# Patient Record
Sex: MALE | Race: WHITE
[De-identification: names, ages, dates, MRNs, and addresses within clinical notes are randomized per-mention and may not be internally consistent; named-entity substitution may affect disease eponyms.]

---

## 2017-01-16 ENCOUNTER — HOSPITAL ENCOUNTER (EMERGENCY)
Dept: HOSPITAL 83 - ED | Age: 82
Discharge: HOME | End: 2017-01-16
Payer: MEDICARE

## 2017-01-16 VITALS — WEIGHT: 240 LBS | HEIGHT: 60 IN

## 2017-01-16 VITALS — SYSTOLIC BLOOD PRESSURE: 174 MMHG | DIASTOLIC BLOOD PRESSURE: 76 MMHG

## 2017-01-16 DIAGNOSIS — N39.41: ICD-10-CM

## 2017-01-16 DIAGNOSIS — Z90.89: ICD-10-CM

## 2017-01-16 DIAGNOSIS — Z96.652: ICD-10-CM

## 2017-01-16 DIAGNOSIS — L03.116: Primary | ICD-10-CM

## 2017-01-16 DIAGNOSIS — Z88.5: ICD-10-CM

## 2017-01-16 DIAGNOSIS — Z79.82: ICD-10-CM

## 2017-01-16 LAB
ALBUMIN SERPL-MCNC: 3.5 GM/DL (ref 3.1–4.5)
ALP SERPL-CCNC: 67 U/L (ref 45–117)
ALT SERPL W P-5'-P-CCNC: 15 U/L (ref 12–78)
AST SERPL-CCNC: 8 IU/L (ref 3–35)
BASOPHILS # BLD AUTO: 0 10*3/UL (ref 0–0.1)
BASOPHILS NFR BLD AUTO: 0.3 % (ref 0–1)
BUN SERPL-MCNC: 25 MG/DL (ref 7–24)
CHLORIDE SERPL-SCNC: 104 MMOL/L (ref 98–107)
CO2 SERPL-SCNC: 29 MMOL/L (ref 21–32)
EOSINOPHIL # BLD AUTO: 0.2 10*3/UL (ref 0–0.4)
EOSINOPHIL # BLD AUTO: 2.1 % (ref 1–4)
ERYTHROCYTE [DISTWIDTH] IN BLOOD BY AUTOMATED COUNT: 13.7 % (ref 0–14.5)
GLUCOSE SERPL-MCNC: 140 MG/DL (ref 65–99)
HCT VFR BLD AUTO: 43.1 % (ref 42–52)
HGB BLD-MCNC: 14.4 G/DL (ref 14–18)
IG #: 0.1 10*3/UL (ref 0–0.1)
LYMPHOCYTES # BLD AUTO: 1.3 10*3/UL (ref 1.3–4.4)
LYMPHOCYTES NFR BLD AUTO: 12.4 % (ref 27–41)
MCH RBC QN AUTO: 28.4 PG (ref 27–31)
MCHC RBC AUTO-ENTMCNC: 33.4 G/DL (ref 33–37)
MCV RBC AUTO: 85 FL (ref 80–94)
MONOCYTES # BLD AUTO: 0.7 10*3/UL (ref 0.1–1)
MONOCYTES NFR BLD MANUAL: 6.2 % (ref 3–9)
NEUT #: 8.2 10*3/UL (ref 2.3–7.9)
NEUT %: 78.4 % (ref 47–73)
NRBC BLD QL AUTO: 0 10*3/UL (ref 0–0)
PLATELET # BLD AUTO: 230 10*3/UL (ref 130–400)
PMV BLD AUTO: 11.1 FL (ref 9.6–12.3)
POTASSIUM SERPL-SCNC: 4.9 MMOL/L (ref 3.5–5.1)
PROT SERPL-MCNC: 7.3 GM/DL (ref 6.4–8.2)
RBC # BLD AUTO: 5.07 10*6/UL (ref 4.5–5.9)
SODIUM SERPL-SCNC: 139 MMOL/L (ref 136–145)
URATE SERPL-MCNC: 6.6 MG/DL (ref 3.5–7.2)
WBC NRBC COR # BLD AUTO: 10.4 10*3/UL (ref 4.8–10.8)

## 2020-02-17 ENCOUNTER — HOSPITAL ENCOUNTER (EMERGENCY)
Dept: HOSPITAL 83 - ED | Age: 85
Discharge: ANOTHER HOSPITAL | End: 2020-02-17
Payer: MEDICARE

## 2020-02-17 VITALS — DIASTOLIC BLOOD PRESSURE: 81 MMHG

## 2020-02-17 VITALS — WEIGHT: 236 LBS | BODY MASS INDEX: 33.79 KG/M2 | HEIGHT: 70 IN

## 2020-02-17 DIAGNOSIS — I48.20: Primary | ICD-10-CM

## 2020-02-17 DIAGNOSIS — Z79.2: ICD-10-CM

## 2020-02-17 DIAGNOSIS — I50.9: ICD-10-CM

## 2020-02-17 DIAGNOSIS — E11.9: ICD-10-CM

## 2020-02-17 DIAGNOSIS — Z88.5: ICD-10-CM

## 2020-02-17 DIAGNOSIS — E78.5: ICD-10-CM

## 2020-02-17 DIAGNOSIS — I11.0: ICD-10-CM

## 2020-02-17 DIAGNOSIS — Z79.899: ICD-10-CM

## 2020-02-17 DIAGNOSIS — R79.89: ICD-10-CM

## 2020-02-17 DIAGNOSIS — Z79.82: ICD-10-CM

## 2020-02-17 DIAGNOSIS — M19.90: ICD-10-CM

## 2020-02-17 LAB
ALBUMIN SERPL-MCNC: 3.6 GM/DL (ref 3.1–4.5)
ALP SERPL-CCNC: 55 U/L (ref 45–117)
ALT SERPL W P-5'-P-CCNC: 20 U/L (ref 12–78)
APTT PPP: 27.6 SECONDS (ref 20–32.1)
AST SERPL-CCNC: 12 IU/L (ref 3–35)
BASOPHILS # BLD AUTO: 0 10*3/UL (ref 0–0.1)
BASOPHILS NFR BLD AUTO: 0.2 % (ref 0–1)
BUN SERPL-MCNC: 30 MG/DL (ref 7–24)
CHLORIDE SERPL-SCNC: 105 MMOL/L (ref 98–107)
CREAT SERPL-MCNC: 1.55 MG/DL (ref 0.7–1.3)
EOSINOPHIL # BLD AUTO: 0.2 10*3/UL (ref 0–0.4)
EOSINOPHIL # BLD AUTO: 1.7 % (ref 1–4)
ERYTHROCYTE [DISTWIDTH] IN BLOOD BY AUTOMATED COUNT: 13.3 % (ref 0–14.5)
HCT VFR BLD AUTO: 47.8 % (ref 42–52)
HGB BLD-MCNC: 15.5 G/DL (ref 14–18)
INR BLD: 0.9 (ref 2–3.5)
LYMPHOCYTES # BLD AUTO: 1.4 10*3/UL (ref 1.3–4.4)
LYMPHOCYTES NFR BLD AUTO: 11.7 % (ref 27–41)
MCH RBC QN AUTO: 28.4 PG (ref 27–31)
MCHC RBC AUTO-ENTMCNC: 32.4 G/DL (ref 33–37)
MCV RBC AUTO: 87.7 FL (ref 80–94)
MONOCYTES # BLD AUTO: 0.8 10*3/UL (ref 0.1–1)
MONOCYTES NFR BLD MANUAL: 6.4 % (ref 3–9)
NEUT #: 9.6 10*3/UL (ref 2.3–7.9)
NEUT %: 79.3 % (ref 47–73)
NRBC BLD QL AUTO: 0 10*3/UL (ref 0–0)
PLATELET # BLD AUTO: 229 10*3/UL (ref 130–400)
PMV BLD AUTO: 11.7 FL (ref 9.6–12.3)
POTASSIUM SERPL-SCNC: 4.7 MMOL/L (ref 3.5–5.1)
PROT SERPL-MCNC: 7.1 GM/DL (ref 6.4–8.2)
RBC # BLD AUTO: 5.45 10*6/UL (ref 4.5–5.9)
SODIUM SERPL-SCNC: 138 MMOL/L (ref 136–145)
TROPONIN I SERPL-MCNC: 0.09 NG/ML (ref ?–0.04)
WBC NRBC COR # BLD AUTO: 12.1 10*3/UL (ref 4.8–10.8)

## 2020-03-18 ENCOUNTER — HOSPITAL ENCOUNTER (INPATIENT)
Dept: HOSPITAL 83 - ED | Age: 85
LOS: 1 days | Discharge: ANOTHER HOSPITAL | DRG: 280 | End: 2020-03-19
Attending: INTERNAL MEDICINE | Admitting: INTERNAL MEDICINE
Payer: MEDICARE

## 2020-03-18 VITALS — DIASTOLIC BLOOD PRESSURE: 60 MMHG | SYSTOLIC BLOOD PRESSURE: 90 MMHG

## 2020-03-18 VITALS — SYSTOLIC BLOOD PRESSURE: 153 MMHG | DIASTOLIC BLOOD PRESSURE: 75 MMHG

## 2020-03-18 VITALS — WEIGHT: 232.25 LBS | HEIGHT: 70 IN | BODY MASS INDEX: 33.25 KG/M2

## 2020-03-18 VITALS — SYSTOLIC BLOOD PRESSURE: 119 MMHG | DIASTOLIC BLOOD PRESSURE: 58 MMHG

## 2020-03-18 VITALS — DIASTOLIC BLOOD PRESSURE: 72 MMHG | SYSTOLIC BLOOD PRESSURE: 149 MMHG

## 2020-03-18 VITALS — SYSTOLIC BLOOD PRESSURE: 109 MMHG | DIASTOLIC BLOOD PRESSURE: 56 MMHG

## 2020-03-18 VITALS — DIASTOLIC BLOOD PRESSURE: 66 MMHG | SYSTOLIC BLOOD PRESSURE: 105 MMHG

## 2020-03-18 VITALS — DIASTOLIC BLOOD PRESSURE: 61 MMHG | SYSTOLIC BLOOD PRESSURE: 118 MMHG

## 2020-03-18 VITALS — DIASTOLIC BLOOD PRESSURE: 72 MMHG

## 2020-03-18 VITALS — SYSTOLIC BLOOD PRESSURE: 132 MMHG | DIASTOLIC BLOOD PRESSURE: 74 MMHG

## 2020-03-18 VITALS — DIASTOLIC BLOOD PRESSURE: 84 MMHG | SYSTOLIC BLOOD PRESSURE: 126 MMHG

## 2020-03-18 VITALS — SYSTOLIC BLOOD PRESSURE: 127 MMHG | DIASTOLIC BLOOD PRESSURE: 52 MMHG

## 2020-03-18 VITALS — DIASTOLIC BLOOD PRESSURE: 74 MMHG

## 2020-03-18 VITALS — DIASTOLIC BLOOD PRESSURE: 62 MMHG

## 2020-03-18 DIAGNOSIS — E11.65: ICD-10-CM

## 2020-03-18 DIAGNOSIS — Z98.42: ICD-10-CM

## 2020-03-18 DIAGNOSIS — E78.5: ICD-10-CM

## 2020-03-18 DIAGNOSIS — I11.0: ICD-10-CM

## 2020-03-18 DIAGNOSIS — I50.9: ICD-10-CM

## 2020-03-18 DIAGNOSIS — E87.1: ICD-10-CM

## 2020-03-18 DIAGNOSIS — I47.1: ICD-10-CM

## 2020-03-18 DIAGNOSIS — Z88.5: ICD-10-CM

## 2020-03-18 DIAGNOSIS — E11.42: ICD-10-CM

## 2020-03-18 DIAGNOSIS — Z98.41: ICD-10-CM

## 2020-03-18 DIAGNOSIS — I21.4: Primary | ICD-10-CM

## 2020-03-18 DIAGNOSIS — Z90.5: ICD-10-CM

## 2020-03-18 DIAGNOSIS — Z88.8: ICD-10-CM

## 2020-03-18 DIAGNOSIS — Z96.1: ICD-10-CM

## 2020-03-18 DIAGNOSIS — N40.0: ICD-10-CM

## 2020-03-18 DIAGNOSIS — N17.0: ICD-10-CM

## 2020-03-18 DIAGNOSIS — Z96.653: ICD-10-CM

## 2020-03-18 DIAGNOSIS — Z90.49: ICD-10-CM

## 2020-03-18 DIAGNOSIS — Z79.82: ICD-10-CM

## 2020-03-18 DIAGNOSIS — E83.41: ICD-10-CM

## 2020-03-18 DIAGNOSIS — I48.0: ICD-10-CM

## 2020-03-18 DIAGNOSIS — E44.0: ICD-10-CM

## 2020-03-18 DIAGNOSIS — Z79.899: ICD-10-CM

## 2020-03-18 DIAGNOSIS — J96.90: ICD-10-CM

## 2020-03-18 DIAGNOSIS — K21.9: ICD-10-CM

## 2020-03-18 DIAGNOSIS — Z82.49: ICD-10-CM

## 2020-03-18 DIAGNOSIS — E66.9: ICD-10-CM

## 2020-03-18 DIAGNOSIS — F32.9: ICD-10-CM

## 2020-03-18 LAB
ALBUMIN SERPL-MCNC: 3.3 GM/DL (ref 3.1–4.5)
ALP SERPL-CCNC: 60 U/L (ref 45–117)
ALT SERPL W P-5'-P-CCNC: 27 U/L (ref 12–78)
APTT PPP: 27.9 SECONDS (ref 20–32.1)
AST SERPL-CCNC: 9 IU/L (ref 3–35)
BASOPHILS # BLD AUTO: 0 10*3/UL (ref 0–0.1)
BASOPHILS NFR BLD AUTO: 0.2 % (ref 0–1)
BUN SERPL-MCNC: 27 MG/DL (ref 7–24)
CHLORIDE SERPL-SCNC: 102 MMOL/L (ref 98–107)
CREAT SERPL-MCNC: 1.85 MG/DL (ref 0.7–1.3)
EOSINOPHIL # BLD AUTO: 0.2 10*3/UL (ref 0–0.4)
EOSINOPHIL # BLD AUTO: 2.1 % (ref 1–4)
ERYTHROCYTE [DISTWIDTH] IN BLOOD BY AUTOMATED COUNT: 13.4 % (ref 0–14.5)
HCT VFR BLD AUTO: 47.4 % (ref 42–52)
HGB BLD-MCNC: 15.3 G/DL (ref 14–18)
INR BLD: 1 (ref 2–3.5)
LYMPHOCYTES # BLD AUTO: 1.2 10*3/UL (ref 1.3–4.4)
LYMPHOCYTES NFR BLD AUTO: 11.8 % (ref 27–41)
MCH RBC QN AUTO: 28.7 PG (ref 27–31)
MCHC RBC AUTO-ENTMCNC: 32.3 G/DL (ref 33–37)
MCV RBC AUTO: 88.9 FL (ref 80–94)
MONOCYTES # BLD AUTO: 0.6 10*3/UL (ref 0.1–1)
MONOCYTES NFR BLD MANUAL: 6.3 % (ref 3–9)
NEUT #: 7.8 10*3/UL (ref 2.3–7.9)
NEUT %: 78.9 % (ref 47–73)
NRBC BLD QL AUTO: 0 % (ref 0–0)
PLATELET # BLD AUTO: 249 10*3/UL (ref 130–400)
PMV BLD AUTO: 11.2 FL (ref 9.6–12.3)
POTASSIUM SERPL-SCNC: 4.4 MMOL/L (ref 3.5–5.1)
PROT SERPL-MCNC: 7 GM/DL (ref 6.4–8.2)
RBC # BLD AUTO: 5.33 10*6/UL (ref 4.5–5.9)
SODIUM SERPL-SCNC: 134 MMOL/L (ref 136–145)
TROPONIN I SERPL-MCNC: 0.1 NG/ML (ref ?–0.04)
WBC NRBC COR # BLD AUTO: 9.9 10*3/UL (ref 4.8–10.8)

## 2020-03-19 LAB
25(OH)D3 SERPL-MCNC: 14.5 NG/ML (ref 30–100)
BASOPHILS # BLD AUTO: 0 10*3/UL (ref 0–0.1)
BASOPHILS NFR BLD AUTO: 0.4 % (ref 0–1)
BUN SERPL-MCNC: 29 MG/DL (ref 7–24)
CHLORIDE SERPL-SCNC: 105 MMOL/L (ref 98–107)
CHOLEST SERPL-MCNC: 98 MG/DL (ref ?–200)
CREAT SERPL-MCNC: 1.59 MG/DL (ref 0.7–1.3)
EOSINOPHIL # BLD AUTO: 0.2 10*3/UL (ref 0–0.4)
EOSINOPHIL # BLD AUTO: 2.3 % (ref 1–4)
ERYTHROCYTE [DISTWIDTH] IN BLOOD BY AUTOMATED COUNT: 13.5 % (ref 0–14.5)
HCT VFR BLD AUTO: 44.8 % (ref 42–52)
HDLC SERPL-MCNC: 30 MG/DL (ref 40–60)
HGB BLD-MCNC: 14.3 G/DL (ref 14–18)
LDLC SERPL DIRECT ASSAY-MCNC: 48 MG/DL (ref 9–159)
LYMPHOCYTES # BLD AUTO: 1.5 10*3/UL (ref 1.3–4.4)
LYMPHOCYTES NFR BLD AUTO: 17.7 % (ref 27–41)
MCH RBC QN AUTO: 28.7 PG (ref 27–31)
MCHC RBC AUTO-ENTMCNC: 31.9 G/DL (ref 33–37)
MCV RBC AUTO: 89.8 FL (ref 80–94)
MONOCYTES # BLD AUTO: 0.7 10*3/UL (ref 0.1–1)
MONOCYTES NFR BLD MANUAL: 8.3 % (ref 3–9)
NEUT #: 5.9 10*3/UL (ref 2.3–7.9)
NEUT %: 70.7 % (ref 47–73)
NRBC BLD QL AUTO: 0 10*3/UL (ref 0–0)
PHOSPHATE SERPL-MCNC: 3.9 MG/DL (ref 2.5–4.9)
PLATELET # BLD AUTO: 227 10*3/UL (ref 130–400)
PMV BLD AUTO: 11 FL (ref 9.6–12.3)
POTASSIUM SERPL-SCNC: 4.8 MMOL/L (ref 3.5–5.1)
RBC # BLD AUTO: 4.99 10*6/UL (ref 4.5–5.9)
SODIUM SERPL-SCNC: 139 MMOL/L (ref 136–145)
TRIGL SERPL-MCNC: 101 MG/DL (ref ?–150)
TSH SERPL DL<=0.005 MIU/L-ACNC: 4.48 UIU/ML (ref 0.36–4.75)
VITAMIN B12: 187 PG/ML (ref 247–911)
VLDLC SERPL CALC-MCNC: 20 MG/DL (ref 6–40)
WBC NRBC COR # BLD AUTO: 8.3 10*3/UL (ref 4.8–10.8)

## 2020-09-19 ENCOUNTER — HOSPITAL ENCOUNTER (INPATIENT)
Dept: HOSPITAL 83 - ED | Age: 85
LOS: 2 days | Discharge: HOME | DRG: 189 | End: 2020-09-21
Attending: INTERNAL MEDICINE | Admitting: INTERNAL MEDICINE
Payer: MEDICARE

## 2020-09-19 VITALS — DIASTOLIC BLOOD PRESSURE: 86 MMHG

## 2020-09-19 VITALS — HEIGHT: 68.98 IN | WEIGHT: 238.56 LBS | BODY MASS INDEX: 35.33 KG/M2

## 2020-09-19 VITALS — SYSTOLIC BLOOD PRESSURE: 154 MMHG | DIASTOLIC BLOOD PRESSURE: 69 MMHG

## 2020-09-19 VITALS — SYSTOLIC BLOOD PRESSURE: 141 MMHG | DIASTOLIC BLOOD PRESSURE: 76 MMHG

## 2020-09-19 VITALS — DIASTOLIC BLOOD PRESSURE: 76 MMHG

## 2020-09-19 VITALS — DIASTOLIC BLOOD PRESSURE: 74 MMHG

## 2020-09-19 DIAGNOSIS — D72.829: ICD-10-CM

## 2020-09-19 DIAGNOSIS — I25.2: ICD-10-CM

## 2020-09-19 DIAGNOSIS — E66.9: ICD-10-CM

## 2020-09-19 DIAGNOSIS — I45.10: ICD-10-CM

## 2020-09-19 DIAGNOSIS — D64.9: ICD-10-CM

## 2020-09-19 DIAGNOSIS — E11.42: ICD-10-CM

## 2020-09-19 DIAGNOSIS — R79.89: ICD-10-CM

## 2020-09-19 DIAGNOSIS — E11.65: ICD-10-CM

## 2020-09-19 DIAGNOSIS — J96.22: ICD-10-CM

## 2020-09-19 DIAGNOSIS — F32.9: ICD-10-CM

## 2020-09-19 DIAGNOSIS — E83.41: ICD-10-CM

## 2020-09-19 DIAGNOSIS — J96.21: Primary | ICD-10-CM

## 2020-09-19 DIAGNOSIS — I25.10: ICD-10-CM

## 2020-09-19 DIAGNOSIS — N40.0: ICD-10-CM

## 2020-09-19 DIAGNOSIS — E78.5: ICD-10-CM

## 2020-09-19 DIAGNOSIS — K21.9: ICD-10-CM

## 2020-09-19 DIAGNOSIS — I50.9: ICD-10-CM

## 2020-09-19 DIAGNOSIS — I11.0: ICD-10-CM

## 2020-09-19 DIAGNOSIS — E53.8: ICD-10-CM

## 2020-09-19 LAB
ALBUMIN SERPL-MCNC: 3.3 GM/DL (ref 3.1–4.5)
ALP SERPL-CCNC: 59 U/L (ref 45–117)
ALT SERPL W P-5'-P-CCNC: 13 U/L (ref 12–78)
APTT PPP: 28.3 SECONDS (ref 20–32.1)
AST SERPL-CCNC: 8 IU/L (ref 3–35)
BASE EXCESS BLDA CALC-SCNC: 1.4 MMOL/L (ref -2–2)
BASOPHILS # BLD AUTO: 0 10*3/UL (ref 0–0.1)
BASOPHILS NFR BLD AUTO: 0.3 % (ref 0–1)
BUN SERPL-MCNC: 27 MG/DL (ref 7–24)
CHLORIDE SERPL-SCNC: 107 MMOL/L (ref 98–107)
CREAT SERPL-MCNC: 1.45 MG/DL (ref 0.7–1.3)
EOSINOPHIL # BLD AUTO: 0 10*3/UL (ref 0–0.4)
EOSINOPHIL # BLD AUTO: 0.4 % (ref 1–4)
ERYTHROCYTE [DISTWIDTH] IN BLOOD BY AUTOMATED COUNT: 13.9 % (ref 0–14.5)
HCO3 BLDA-SCNC: 28 MMOL/L (ref 22–26)
HCT VFR BLD AUTO: 42.9 % (ref 42–52)
INR BLD: 1 (ref 2–3.5)
LYMPHOCYTES # BLD AUTO: 0.7 10*3/UL (ref 1.3–4.4)
LYMPHOCYTES NFR BLD AUTO: 5.8 % (ref 27–41)
MCH RBC QN AUTO: 27 PG (ref 27–31)
MCHC RBC AUTO-ENTMCNC: 30.8 G/DL (ref 33–37)
MCV RBC AUTO: 87.7 FL (ref 80–94)
MONOCYTES # BLD AUTO: 0.6 10*3/UL (ref 0.1–1)
MONOCYTES NFR BLD MANUAL: 5.7 % (ref 3–9)
NEUT #: 9.8 10*3/UL (ref 2.3–7.9)
NEUT %: 87.4 % (ref 47–73)
NRBC BLD QL AUTO: 0 10*3/UL (ref 0–0)
PCO2 BLDA: 55 MMHG (ref 35–45)
PH BLDA: 7.33 [PH] (ref 7.35–7.45)
PLATELET # BLD AUTO: 198 10*3/UL (ref 130–400)
PMV BLD AUTO: 11.6 FL (ref 9.6–12.3)
PO2 BLDA: 61 MMHG (ref 80–90)
POTASSIUM SERPL-SCNC: 4.7 MMOL/L (ref 3.5–5.1)
PROT SERPL-MCNC: 7 GM/DL (ref 6.4–8.2)
RBC # BLD AUTO: 4.89 10*6/UL (ref 4.5–5.9)
SAO2 % BLDA: 91 % (ref 95–97)
SODIUM SERPL-SCNC: 137 MMOL/L (ref 136–145)
TROPONIN I SERPL-MCNC: 0.05 NG/ML (ref ?–0.04)
WBC NRBC COR # BLD AUTO: 11.2 10*3/UL (ref 4.8–10.8)

## 2020-09-19 NOTE — NUR
INCONTINENT AT THIS TIME & YELLING OUT.  HARVEY CARE PROVIDED.  PT. ENCOURAGED
TO USE CALL LIGHT.  VERBALIZED UNDERSTANDING.  CALL LIGHT WITHIN REACH/BED
ALARM INTACT.

## 2020-09-19 NOTE — NUR
TOOK PO MEDICATION WITH MUCH DIFFICULTY.  ASSIST OF PA TO ROLL PATIENT OVER ON
HIS BACK TO GIVE HIM A PO MEDICATION.

## 2020-09-19 NOTE — NUR
Time: 1330
A 86  year old MALE admitted to 
under services of ROSEMARY DEL TORO DO.
Pt. arrived via bed from ER
[facility].  Chief complaint: CHF.
 
ALEXIS GARDNER

## 2020-09-20 VITALS — DIASTOLIC BLOOD PRESSURE: 62 MMHG | SYSTOLIC BLOOD PRESSURE: 151 MMHG

## 2020-09-20 VITALS — DIASTOLIC BLOOD PRESSURE: 67 MMHG | SYSTOLIC BLOOD PRESSURE: 138 MMHG

## 2020-09-20 VITALS — DIASTOLIC BLOOD PRESSURE: 78 MMHG

## 2020-09-20 VITALS — DIASTOLIC BLOOD PRESSURE: 66 MMHG

## 2020-09-20 VITALS — DIASTOLIC BLOOD PRESSURE: 76 MMHG

## 2020-09-20 LAB
25(OH)D3 SERPL-MCNC: 17.7 NG/ML (ref 30–100)
ALBUMIN SERPL-MCNC: 3.2 GM/DL (ref 3.1–4.5)
ALP SERPL-CCNC: 59 U/L (ref 45–117)
ALT SERPL W P-5'-P-CCNC: 12 U/L (ref 12–78)
APPEARANCE UR: CLEAR
APTT PPP: 28.3 SECONDS (ref 20–32.1)
AST SERPL-CCNC: 8 IU/L (ref 3–35)
BACTERIA #/AREA URNS HPF: (no result) /[HPF]
BASOPHILS # BLD AUTO: 0 10*3/UL (ref 0–0.1)
BASOPHILS NFR BLD AUTO: 0.2 % (ref 0–1)
BILIRUB UR QL STRIP: NEGATIVE
BUN SERPL-MCNC: 30 MG/DL (ref 7–24)
CASTS URNS QL MICRO: (no result)
CHLORIDE SERPL-SCNC: 106 MMOL/L (ref 98–107)
COLOR UR: YELLOW
CREAT SERPL-MCNC: 1.32 MG/DL (ref 0.7–1.3)
EOSINOPHIL # BLD AUTO: 0.1 10*3/UL (ref 0–0.4)
EOSINOPHIL # BLD AUTO: 1.2 % (ref 1–4)
EPI CELLS #/AREA URNS HPF: (no result) /[HPF]
ERYTHROCYTE [DISTWIDTH] IN BLOOD BY AUTOMATED COUNT: 14 % (ref 0–14.5)
GLUCOSE UR QL: NEGATIVE
HCT VFR BLD AUTO: 43.1 % (ref 42–52)
HGB UR QL STRIP: NEGATIVE
INR BLD: 1 (ref 2–3.5)
KETONES UR QL STRIP: NEGATIVE
LEUKOCYTE ESTERASE UR QL STRIP: NEGATIVE
LYMPHOCYTES # BLD AUTO: 0.7 10*3/UL (ref 1.3–4.4)
LYMPHOCYTES NFR BLD AUTO: 8.4 % (ref 27–41)
MCH RBC QN AUTO: 27.5 PG (ref 27–31)
MCHC RBC AUTO-ENTMCNC: 31.1 G/DL (ref 33–37)
MCV RBC AUTO: 88.5 FL (ref 80–94)
MONOCYTES # BLD AUTO: 0.8 10*3/UL (ref 0.1–1)
MONOCYTES NFR BLD MANUAL: 9.4 % (ref 3–9)
MUCOUS THREADS URNS QL MICRO: (no result)
NEUT #: 6.9 10*3/UL (ref 2.3–7.9)
NEUT %: 80.2 % (ref 47–73)
NITRITE UR QL STRIP: NEGATIVE
NRBC BLD QL AUTO: 0 10*3/UL (ref 0–0)
PH UR STRIP: 5 [PH] (ref 4.5–8)
PLATELET # BLD AUTO: 191 10*3/UL (ref 130–400)
PMV BLD AUTO: 11.7 FL (ref 9.6–12.3)
POTASSIUM SERPL-SCNC: 4.5 MMOL/L (ref 3.5–5.1)
PROT SERPL-MCNC: 6.8 GM/DL (ref 6.4–8.2)
RBC # BLD AUTO: 4.87 10*6/UL (ref 4.5–5.9)
RBC #/AREA URNS HPF: (no result) RBC/HPF (ref 0–2)
SODIUM SERPL-SCNC: 140 MMOL/L (ref 136–145)
SP GR UR: 1.01 (ref 1–1.03)
T4 FREE SERPL-MCNC: 1 NG/DL (ref 0.76–1.46)
UROBILINOGEN UR STRIP-MCNC: 0.2 E.U./DL (ref 0–1)
VITAMIN B12: 184 PG/ML (ref 247–911)
WBC #/AREA URNS HPF: (no result) WBC/HPF (ref 0–5)
WBC NRBC COR # BLD AUTO: 8.5 10*3/UL (ref 4.8–10.8)

## 2020-09-20 NOTE — NUR
BLADDER SCANNED FOR APPROXIMATELY 300 CC'S.  CALLED DR. MANNING PERTAINING TO
BLADDER SCAN RESULTS.   NO NEW ORDERS RECEIVED.

## 2020-09-20 NOTE — NUR
PATIENT SITTING IN RECLINER. DISORIENTED TO PLACE, REORIENTS EASILY. NO
SHORTNESS OF BREATH NOTED, O2 FURTHER TITRATED TO 2L. CALL LIGHT WITHIN REACH.
NO VOICED COMPLAINTS

## 2020-09-20 NOTE — NUR
PULSE OX 99% 3L. DENIES SHORTNESS OF BREATH. NO DISTRESS. CALL LIGHT WITHIN
REACH. NO VOICED COMPLAINTS

## 2020-09-20 NOTE — NUR
ASSISTED TO RECLINER AND POSITIONED FOR COMFORT. RESPIRATIONS EASY. LUNGS
DIMINISHED, CLEAR. PULSE OX 94% 4L. TRACE PEDAL EDEMA. CALL LIGHT WITHIN
REACH. NO VOICED COMPLAINTS. BED ALARM MAINTAINED FOR SAFETY

## 2020-09-20 NOTE — NUR
[place]. PLEASANT AND COOPERATIVE. LYING IN BED ON LT SIDE. TOOK PO MEDS WITHOUT
DIFFICULTY. NO C/O VOICED. CALL LIGHT IN REACH.

## 2020-09-20 NOTE — NUR
REQUESTED AND RECEIVED TYLENOL PER PRN ORDER FOR COMPLAINTS OF HEADACHE RATING
A 5. CALL LIGHT WITHIN REACH WILL MONITOR

## 2020-09-21 VITALS — DIASTOLIC BLOOD PRESSURE: 78 MMHG | SYSTOLIC BLOOD PRESSURE: 115 MMHG

## 2020-09-21 VITALS — DIASTOLIC BLOOD PRESSURE: 72 MMHG

## 2020-09-21 LAB
BASOPHILS # BLD AUTO: 0 10*3/UL (ref 0–0.1)
BASOPHILS NFR BLD AUTO: 0.3 % (ref 0–1)
BUN SERPL-MCNC: 35 MG/DL (ref 7–24)
CHLORIDE SERPL-SCNC: 106 MMOL/L (ref 98–107)
CREAT SERPL-MCNC: 1.46 MG/DL (ref 0.7–1.3)
EOSINOPHIL # BLD AUTO: 0.2 10*3/UL (ref 0–0.4)
EOSINOPHIL # BLD AUTO: 2.7 % (ref 1–4)
ERYTHROCYTE [DISTWIDTH] IN BLOOD BY AUTOMATED COUNT: 13.9 % (ref 0–14.5)
HCT VFR BLD AUTO: 44.2 % (ref 42–52)
LYMPHOCYTES # BLD AUTO: 0.8 10*3/UL (ref 1.3–4.4)
LYMPHOCYTES NFR BLD AUTO: 11.2 % (ref 27–41)
MCH RBC QN AUTO: 27.7 PG (ref 27–31)
MCHC RBC AUTO-ENTMCNC: 31.2 G/DL (ref 33–37)
MCV RBC AUTO: 88.8 FL (ref 80–94)
MONOCYTES # BLD AUTO: 0.6 10*3/UL (ref 0.1–1)
MONOCYTES NFR BLD MANUAL: 8.5 % (ref 3–9)
NEUT #: 5.8 10*3/UL (ref 2.3–7.9)
NEUT %: 76.8 % (ref 47–73)
NRBC BLD QL AUTO: 0 % (ref 0–0)
PLATELET # BLD AUTO: 180 10*3/UL (ref 130–400)
PMV BLD AUTO: 11.3 FL (ref 9.6–12.3)
POTASSIUM SERPL-SCNC: 4.6 MMOL/L (ref 3.5–5.1)
RBC # BLD AUTO: 4.98 10*6/UL (ref 4.5–5.9)
SODIUM SERPL-SCNC: 140 MMOL/L (ref 136–145)
WBC NRBC COR # BLD AUTO: 7.5 10*3/UL (ref 4.8–10.8)

## 2020-09-21 NOTE — NUR
Spoke to Jeanne at [facility] regarding patient O2 supply. They do not have
patient. Spoke to Kirsten at [facility] and patient does not have O2 with them.
Spoke to wife, Emeli, regarding home O2 supplier. Patient has [employer]. He uses
O2 @ 2L nc and portable O2 tanks. He doesn't have a nebulizer at home.

## 2020-09-21 NOTE — NUR
PATIENT DISCHARGED TO HOME. IV AND CARDIAC MONITOR DISCONTINUED. DISCHARGE
INSTRUCTIONS GIVEN AND REVIEWED. RN TALKED WITH SPOUSE ON THE PHONE. DISCUSSED
DISCHARGE INSTRUCTIONS WITH HER.

## 2020-09-21 NOTE — NUR
PT ASSESSED FOR HOME O2. PT CURRENTLY HAS HOME O2 AT HOME. ASSESSING PT FOR
ANY INCREASED O2 NEEDS.
   SPO2 RA AT REST:           80-85%
   SPO2 WITH 2 L NC AT REST:  87-91%
   APO2 WITH 3 L NC AT REST   90-93%
PT AMBULATED ON 3 L NC: SPO2  89-94%
PT REQUIRES CONTINUOUS O2 AT 3 L NC AT REST AND WITH AMBULATION

## 2020-09-21 NOTE — NUR
in to talk to patient.
Patient states lives at home with his wife.
There are 0 steps in the home.
Physician: Dr. Chris Tiwari
Pharmacy: [facility] health services: none
Patient's level of ADLs: MINIMAL ASSIST
Patient has working utilities: yes
DME: cane, walker, O2 @ 2L nc, portable O2 tanks, O2 supplier [employer]
Follow-up physician's appointment after d/c: will be made by the hospitalist
nurse director upon discharge
Does patient want to access PORTAL?: no
Discharge plan discussed with patient. He is sitting up in his bedside chair.
He lives at home with his wife. He states he is independent in his ADLs and
ambulates with either a cane or a walker. Discussed home health care services
and he declines. CM will continue to follow for any discharge planning needs.
When medically stable he will be discharged to home. He states his wife will
provide transportation on discharge.
 
MICHAEL VALDES